# Patient Record
Sex: FEMALE | URBAN - METROPOLITAN AREA
[De-identification: names, ages, dates, MRNs, and addresses within clinical notes are randomized per-mention and may not be internally consistent; named-entity substitution may affect disease eponyms.]

---

## 2018-11-28 ENCOUNTER — APPOINTMENT (RX ONLY)
Dept: URBAN - METROPOLITAN AREA CLINIC 12 | Facility: CLINIC | Age: 53
Setting detail: DERMATOLOGY
End: 2018-11-28

## 2018-11-28 DIAGNOSIS — B35.1 TINEA UNGUIUM: ICD-10-CM

## 2018-11-28 PROBLEM — J45.909 UNSPECIFIED ASTHMA, UNCOMPLICATED: Status: ACTIVE | Noted: 2018-11-28

## 2018-11-28 PROBLEM — I10 ESSENTIAL (PRIMARY) HYPERTENSION: Status: ACTIVE | Noted: 2018-11-28

## 2018-11-28 PROBLEM — J30.1 ALLERGIC RHINITIS DUE TO POLLEN: Status: ACTIVE | Noted: 2018-11-28

## 2018-11-28 PROCEDURE — ? COUNSELING

## 2018-11-28 PROCEDURE — 87220 TISSUE EXAM FOR FUNGI: CPT

## 2018-11-28 PROCEDURE — ? PRESCRIPTION

## 2018-11-28 PROCEDURE — ? ORDER TESTS

## 2018-11-28 PROCEDURE — 99202 OFFICE O/P NEW SF 15 MIN: CPT

## 2018-11-28 PROCEDURE — ? KOH PREP

## 2018-11-28 RX ORDER — SULCONAZOLE NITRATE 10 MG/ML
SOLUTION TOPICAL
Qty: 1 | Refills: 1 | Status: ERX | COMMUNITY
Start: 2018-11-28

## 2018-11-28 RX ADMIN — SULCONAZOLE NITRATE: 10 SOLUTION TOPICAL at 22:40

## 2018-11-28 ASSESSMENT — LOCATION SIMPLE DESCRIPTION DERM: LOCATION SIMPLE: LEFT MIDDLE FINGER

## 2018-11-28 ASSESSMENT — LOCATION DETAILED DESCRIPTION DERM: LOCATION DETAILED: LEFT DISTAL DORSAL MIDDLE FINGER

## 2018-11-28 ASSESSMENT — LOCATION ZONE DERM: LOCATION ZONE: FINGER

## 2018-11-28 NOTE — HPI: NAIL DISCOLORATION (MELANONYCHIA)
Is This A New Presentation, Or A Follow-Up?: Nail Discoloration
How Severe Is It?: mild
Additional History: \\n\\nPt rheum. And neurologist suggested pt come in due to discoloration of nails. Pt also has bluish tint to finger tips, underside.

## 2018-11-28 NOTE — PROCEDURE: KOH PREP
Showing: fungal hyphal elements: negative
Koh Procedure Text (Tissue Harvesting Technique): A 15-blade scalpel was used to scrape the skin. The skin scrapings were placed on a glass slide, covered with a coverslip and a KOH solution was applied.
Koh Intro Text (From The.....): A KOH prep was ordered and evaluated from the
Detail Level: Detailed